# Patient Record
Sex: FEMALE | URBAN - METROPOLITAN AREA
[De-identification: names, ages, dates, MRNs, and addresses within clinical notes are randomized per-mention and may not be internally consistent; named-entity substitution may affect disease eponyms.]

---

## 2019-04-09 DIAGNOSIS — B18.2 CHRONIC HEPATITIS C WITHOUT HEPATIC COMA: Primary | ICD-10-CM

## 2019-04-17 ENCOUNTER — PROCEDURE VISIT (OUTPATIENT)
Dept: HEPATOLOGY | Facility: CLINIC | Age: 39
End: 2019-04-17
Attending: NURSE PRACTITIONER
Payer: MEDICAID

## 2019-04-17 DIAGNOSIS — B18.2 CHRONIC HEPATITIS C WITHOUT HEPATIC COMA: ICD-10-CM

## 2019-04-17 PROCEDURE — 91200 LIVER ELASTOGRAPHY: CPT | Mod: PBBFAC | Performed by: NURSE PRACTITIONER

## 2019-04-17 PROCEDURE — 91200 LIVER ELASTOGRAPHY: CPT | Mod: 26,S$PBB,, | Performed by: NURSE PRACTITIONER

## 2019-04-17 PROCEDURE — 91200 PR LIVER ELASTOGRAPHY W/OUT IMAG W/INTERP & REPORT: ICD-10-PCS | Mod: 26,S$PBB,, | Performed by: NURSE PRACTITIONER

## 2019-04-17 NOTE — PROCEDURES
Fibroscan Procedure     Name: Laura Castaneda  Date of Procedure : 2019   :: Sera Brothers NP  Diagnosis: HCV    Probe: XL    Findings  Median liver stiffness score: 6.7 KPa  CAP readin dB/m    IQR/med: 15 %    Interpretation  Fibrosis interpretation is based on medial liver stiffness - [place] (kPa).     Fibrosis stage: F 0-1     Steatosis interpretation is based on controlled attenuation parameter - (dB/m).    Steatosis grade: <S1 , <11% steatosis